# Patient Record
Sex: FEMALE | Race: BLACK OR AFRICAN AMERICAN | NOT HISPANIC OR LATINO | Employment: UNEMPLOYED | URBAN - METROPOLITAN AREA
[De-identification: names, ages, dates, MRNs, and addresses within clinical notes are randomized per-mention and may not be internally consistent; named-entity substitution may affect disease eponyms.]

---

## 2018-12-12 ENCOUNTER — LAB (OUTPATIENT)
Dept: LAB | Facility: HOSPITAL | Age: 23
End: 2018-12-12

## 2018-12-12 ENCOUNTER — OFFICE VISIT (OUTPATIENT)
Dept: OBSTETRICS AND GYNECOLOGY | Facility: CLINIC | Age: 23
End: 2018-12-12

## 2018-12-12 VITALS
WEIGHT: 293 LBS | DIASTOLIC BLOOD PRESSURE: 70 MMHG | SYSTOLIC BLOOD PRESSURE: 118 MMHG | BODY MASS INDEX: 47.09 KG/M2 | HEIGHT: 66 IN

## 2018-12-12 DIAGNOSIS — L68.0 ANDROGEN-DEPENDENT HIRSUTISM: ICD-10-CM

## 2018-12-12 DIAGNOSIS — E28.2 PCOS (POLYCYSTIC OVARIAN SYNDROME): Primary | ICD-10-CM

## 2018-12-12 DIAGNOSIS — Z01.411 ENCOUNTER FOR GYNECOLOGICAL EXAMINATION WITH ABNORMAL FINDING: ICD-10-CM

## 2018-12-12 DIAGNOSIS — E28.2 PCOS (POLYCYSTIC OVARIAN SYNDROME): ICD-10-CM

## 2018-12-12 LAB
GLUCOSE P FAST SERPL-MCNC: 94 MG/DL (ref 65–99)
TESTOST SERPL-MCNC: 37.59 NG/DL (ref 0–813.86)
TSH SERPL DL<=0.05 MIU/L-ACNC: 2.55 MIU/ML (ref 0.35–5.35)

## 2018-12-12 PROCEDURE — 84443 ASSAY THYROID STIM HORMONE: CPT

## 2018-12-12 PROCEDURE — 83525 ASSAY OF INSULIN: CPT

## 2018-12-12 PROCEDURE — 82947 ASSAY GLUCOSE BLOOD QUANT: CPT

## 2018-12-12 PROCEDURE — 99385 PREV VISIT NEW AGE 18-39: CPT | Performed by: OBSTETRICS & GYNECOLOGY

## 2018-12-12 PROCEDURE — 84403 ASSAY OF TOTAL TESTOSTERONE: CPT

## 2018-12-12 PROCEDURE — 36415 COLL VENOUS BLD VENIPUNCTURE: CPT

## 2018-12-12 NOTE — PROGRESS NOTES
Chief Complaint   Patient presents with   • Gynecologic Exam     patient was seen in the ED at [facility] in November.  States that she had a pap test at that time.  Was seen because she had been bleeding X 2 months.  was told that she has symptoms consistent with PCOS.       Tala Kelly is a 23 y.o. year old  presenting to be seen for her annual exam.  This patient has been seen at the [facility].  She has a long history of irregular menses, obesity, loss of hair on the scalp, facial hirsutism, and inability to conceive.  She has not had a complete workup.  She often goes 6-12 months without a period.  She has been bleeding for the past 6 weeks.  She desires evaluation.  Ultimately, she desires to conceive.    SCREENING TESTS  States that she had a Pap test at [facility]  Year 2012 2016 2017 2018  2024 2025 2026 2027 2028 2029 2030 2031 2032 2033   Age                         PAP                         HPV high risk                         Mammogram                         EMILY score                         Breast MRI                         Lipids                         Vitamin D                         Colonoscopy                         DEXA  Frax (hip/any)                         Ovarian Screen                             She exercises regularly: no.  She wears her seat belt: yes.  She has concerns about domestic violence: no.  She has noticed changes in height: no    GYN screening history:  · None available.    No Additional Complaints Reported    The following portions of the patient's history were reviewed and updated as appropriate:vital signs and   She  has no past medical history on file.  She does not have any pertinent problems on file.  She  has no past surgical history on file.  Her family history includes Breast cancer in her maternal aunt and maternal grandmother.  She  reports that she has quit smoking. she has never used  "smokeless tobacco. She reports that she drinks alcohol. She reports that she does not use drugs.  Current Outpatient Medications   Medication Sig Dispense Refill   • norgestrel-ethinyl estradiol (LOW-OGESTREL,CRYSELLE) 0.3-30 MG-MCG per tablet Take 1 tablet by mouth Daily. 28 tablet 11     No current facility-administered medications for this visit.      She is allergic to tramadol..    Review of Systems  A comprehensive review of systems was taken.  Constitutional: negative for fever, chills, activity change, appetite change, fatigue and unexpected weight change.  Respiratory: negative  Cardiovascular: negative  Gastrointestinal: negative  Genitourinary:positive for irregular menses  Musculoskeletal:negative  Behavioral/Psych: negative       /70   Ht 167.6 cm (66\")   Wt (!) 161 kg (355 lb 6.4 oz)   LMP 09/25/2018 (Approximate)   BMI 57.36 kg/m²     Physical Exam    General:  alert; cooperative; well developed; well nourished  obese - Body mass index is 57.36 kg/m².   Skin:  No suspicious lesions seen  Facial hirsutism   Thyroid: normal to inspection and palpation   Lungs:  clear to auscultation bilaterally   Heart:  regular rate and rhythm, S1, S2 normal, no murmur, click, rub or gallop   Breasts:  Examined in supine position  Symmetric without masses or skin dimpling  Nipples normal without inversion, lesions or discharge  There are no palpable axillary nodes   Abdomen: soft, non-tender; no masses  no umbilical or inguinal hernias are present  no hepato-splenomegaly  obese   Pelvis: Clinical staff was present for exam  External genitalia:  normal appearance of the external genitalia including Bartholin's and [place]'s glands.  Vaginal:  normal pink mucosa without prolapse or lesions.  Cervix:  normal appearance.  Uterus:  normal size, shape and consistency. anteverted;  Adnexa:  non palpable bilaterally.     Lab Review   No data reviewed    Imaging  No data reviewed         ASSESSMENT  Problems Addressed " this Visit        Digestive    BMI 50.0-59.9, adult (CMS/[facility])       Endocrine    PCOS (polycystic ovarian syndrome) - Primary    Relevant Orders    Glucose, Fasting    Insulin, Random    TSH    Testosterone    US Non-ob Transvaginal      Other Visit Diagnoses     Encounter for gynecological examination with abnormal finding        Androgen-dependent hirsutism              PLAN    · Medications prescribed this encounter:  No orders of the defined types were placed in this encounter.  · Sting glucose, fasting insulin, TSH, and total testosterone ordered  · Pelvic ultrasound ordered.  · The patient was started on Low Ogestrel oral contraceptives and will return in 1 month to review her labs and pelvic ultrasound  · Calcium, 600 mg/ Vit. D, 400 IU daily; regular weight-bearing exercise  · Follow up: 1 month(s)  *Please note that portions of this documentation may have been completed with a voice recognition program.  Efforts were made to edit this dictation, but occasional words may have been mistranscribed.       This note was electronically signed.    SUSAN Loredo MD  December 12, 2018  9:30 AM

## 2018-12-14 LAB — INSULIN SERPL-ACNC: 18 UIU/ML

## 2018-12-18 ENCOUNTER — TELEPHONE (OUTPATIENT)
Dept: OBSTETRICS AND GYNECOLOGY | Facility: CLINIC | Age: 23
End: 2018-12-18

## 2018-12-18 RX ORDER — SPIRONOLACTONE 50 MG/1
50 TABLET, FILM COATED ORAL DAILY
Qty: 30 TABLET | Refills: 3 | Status: SHIPPED | OUTPATIENT
Start: 2018-12-18 | End: 2019-01-10

## 2018-12-18 NOTE — TELEPHONE ENCOUNTER
Per Dr. Loredo, patient has been contacted by telephone and notified that she needs to start Metformin 500 mg BID and spironolactone 50 mg daily.

## 2018-12-21 ENCOUNTER — TELEPHONE (OUTPATIENT)
Dept: OBSTETRICS AND GYNECOLOGY | Facility: CLINIC | Age: 23
End: 2018-12-21

## 2018-12-21 NOTE — TELEPHONE ENCOUNTER
Patient called c/o nausea after beginning metformin.  I advised her to take with food and to give it some time.  She will call back if she can not tolerate this medication.

## 2019-01-10 ENCOUNTER — OFFICE VISIT (OUTPATIENT)
Dept: OBSTETRICS AND GYNECOLOGY | Facility: CLINIC | Age: 24
End: 2019-01-10

## 2019-01-10 VITALS
SYSTOLIC BLOOD PRESSURE: 120 MMHG | BODY MASS INDEX: 47.09 KG/M2 | DIASTOLIC BLOOD PRESSURE: 76 MMHG | HEIGHT: 66 IN | WEIGHT: 293 LBS

## 2019-01-10 DIAGNOSIS — N92.0 MENORRHAGIA WITH REGULAR CYCLE: ICD-10-CM

## 2019-01-10 DIAGNOSIS — E28.2 PCOS (POLYCYSTIC OVARIAN SYNDROME): Primary | ICD-10-CM

## 2019-01-10 PROCEDURE — 99213 OFFICE O/P EST LOW 20 MIN: CPT | Performed by: OBSTETRICS & GYNECOLOGY

## 2019-01-10 RX ORDER — IBUPROFEN 800 MG/1
800 TABLET ORAL EVERY 6 HOURS PRN
Qty: 40 TABLET | Refills: 1 | OUTPATIENT
Start: 2019-01-10 | End: 2019-08-11

## 2019-01-10 NOTE — PROGRESS NOTES
Chief Complaint   Patient presents with   • Results     pelvic ultrasound today, discuss labs and medications.   • Constipation       Tala Kelly is a 23 y.o. year old  presenting to be seen for follow-up of polycystic ovarian syndrome, and menorrhagia.  This patient was seen in 2018 and had a glucose/insulin of 94/18 with a ratio of 5.2/1.0.  Her total testosterone was 37.59.  Her TSH was normal.  She was started on low Ogestrel oral contraceptives to attempt to control her cycles and heavy bleeding.  She was started on metformin extended release, 500 mg twice a day to treat insulin resistance.  She was started on Aldactone, 50 mg twice a day but was unable to tolerate this due to nausea.  She is here today for a pelvic ultrasound.    Social History     Substance and Sexual Activity   Sexual Activity Yes   • Partners: Male   • Birth control/protection: OCP     SCREENING TESTS    Year 2012   Age                         PAP       Neg.                  HPV high risk                         Mammogram                         EMILY score                         Breast MRI                         Lipids                         Vitamin D                         Colonoscopy                         DEXA  Frax (hip/any)                         Ovarian Screen                             No Additional Complaints Reported    The following portions of the patient's history were reviewed and updated as appropriate:vital signs and   She  has a past medical history of PCOS (polycystic ovarian syndrome).  She does not have any pertinent problems on file.  She  has no past surgical history on file.  Current Outpatient Medications   Medication Sig Dispense Refill   • metFORMIN (GLUCOPHAGE) 500 MG tablet Take 1 tablet by mouth 2 (Two) Times a Day With Meals. 60 tablet 3   • norgestrel-ethinyl estradiol  "(LOW-OGESTREL,CRYSELLE) 0.3-30 MG-MCG per tablet Take 1 tablet by mouth Daily. 28 tablet 11     No current facility-administered medications for this visit.      She is allergic to tramadol..        Review of Systems  A comprehensive review of systems was not done.  Constitutional: negative for fever, chills, activity change, appetite change, fatigue and unexpected weight change.  Respiratory: not done  Cardiovascular: negative  Gastrointestinal: positive for constipation  Genitourinary:negative  Musculoskeletal:not done  Behavioral/Psych: negative          /76   Ht 167.6 cm (66\")   Wt (!) 158 kg (348 lb 3.2 oz)   LMP 01/03/2019   BMI 56.20 kg/m²     Physical Exam    General:  alert; cooperative; well developed; well nourished  obese - Body mass index is 56.2 kg/m².   Skin:  Not performed.   Thyroid: normal to inspection and palpation   Lungs:  clear to auscultation bilaterally   Heart:  regular rate and rhythm, S1, S2 normal, no murmur, click, rub or gallop   Breasts:  Not performed.   Abdomen: Not performed.   Pelvis: Not performed.     Lab Review   TSH results and Glucose/insulin results. Glucose= 94 and insulin= 18.    Imaging   Pelvic ultrasound results- the ultrasound today reveals a normal size anteverted uterus with an endometrial stripe of 11.7 mm.  The cervix is normal.  The ovaries are normal.  There is a small echogenic area in the right ovary.    Medical counseling was given to patient for the following topics: diagnostic results, instructions for management, risk factor reductions, prognosis, impressions, risks and benefits of treatment options and importance of treatment compliance . Total time of the encounter was 19 minute(s) and 15 minute(s)  was spent in face to face counseling.  I have counseled the patient that the purpose of treating her with Low Ogestrel is to attempt to control her cycles and decrease her flow.  I have counseled her to take ibuprofen, 800 mg every 6 hours for the " first 2 days of each period I have counseled her to continue metformin extended release, 500 mg twice a day.  I have counseled her to use MiraLAX nightly for constipation and to increase fiber and fluids.  I have counseled her that we will repeat her laboratory evaluation in April 2019 to assess response.  I have reviewed with her potential side effects of her medications          ASSESSMENT  Problems Addressed this Visit        Digestive    BMI 50.0-59.9, adult (CMS/[facility])       Endocrine    PCOS (polycystic ovarian syndrome) - Primary       Genitourinary    Menorrhagia with regular cycle            PLAN    · Medications prescribed this encounter:  No orders of the defined types were placed in this encounter.  · Upon return she will have a fasting glucose, fasting insulin, and a repeat total testosterone  · Follow up: 3 month(s)  · Calcium, 600 mg/ Vit. D, 400 IU daily     *Please note that portions of this documentation may have been completed with a voice recognition program.  Efforts were made to edit this dictation, but occasional words may have been mistranscribed.     This note was electronically signed.    SUSAN Loredo MD  January 10, 2019  10:33 AM

## 2019-08-11 ENCOUNTER — APPOINTMENT (OUTPATIENT)
Dept: GENERAL RADIOLOGY | Facility: HOSPITAL | Age: 24
End: 2019-08-11

## 2019-08-11 ENCOUNTER — HOSPITAL ENCOUNTER (EMERGENCY)
Facility: HOSPITAL | Age: 24
Discharge: HOME | End: 2019-08-11
Attending: EMERGENCY MEDICINE | Admitting: EMERGENCY MEDICINE

## 2019-08-11 VITALS
SYSTOLIC BLOOD PRESSURE: 110 MMHG | TEMPERATURE: 98.2 F | DIASTOLIC BLOOD PRESSURE: 62 MMHG | WEIGHT: 293 LBS | HEIGHT: 66 IN | BODY MASS INDEX: 47.09 KG/M2 | RESPIRATION RATE: 16 BRPM | OXYGEN SATURATION: 97 % | HEART RATE: 88 BPM

## 2019-08-11 DIAGNOSIS — S83.91XA SPRAIN OF RIGHT KNEE, UNSPECIFIED LIGAMENT, INITIAL ENCOUNTER: Primary | ICD-10-CM

## 2019-08-11 DIAGNOSIS — M25.461 EFFUSION, RIGHT KNEE: ICD-10-CM

## 2019-08-11 PROCEDURE — 73560 X-RAY EXAM OF KNEE 1 OR 2: CPT

## 2019-08-11 PROCEDURE — 99283 EMERGENCY DEPT VISIT LOW MDM: CPT

## 2019-08-11 RX ORDER — ETODOLAC 200 MG/1
200 CAPSULE ORAL EVERY 8 HOURS
Qty: 12 CAPSULE | Refills: 0 | Status: SHIPPED | OUTPATIENT
Start: 2019-08-11 | End: 2020-01-28

## 2019-08-11 RX ORDER — HYDROCODONE BITARTRATE AND ACETAMINOPHEN 7.5; 325 MG/1; MG/1
1 TABLET ORAL ONCE
Status: COMPLETED | OUTPATIENT
Start: 2019-08-11 | End: 2019-08-11

## 2019-08-11 RX ADMIN — HYDROCODONE BITARTRATE AND ACETAMINOPHEN 1 TABLET: 7.5; 325 TABLET ORAL at 13:13

## 2019-08-11 NOTE — ED PROVIDER NOTES
Subjective   Tala Kelly is a 23 y.o.female who presents to the ED with complaints of right knee pain. The patient reports her pain has been ongoing since she had to climb out the window of a car yesterday. She has not taken any medication for her pain. Additionally, she has been ambulatory with pain since the incident.There are no other complaints at this time.         History provided by:  Patient  Knee Pain   Location:  Knee  Time since incident:  1 day  Injury: yes    Mechanism of injury comment:  Climbing out the window of a car  Knee location:  R knee  Pain details:     Quality:  Aching    Radiates to:  Does not radiate    Severity:  Moderate    Onset quality:  Sudden    Duration:  2 days    Timing:  Constant    Progression:  Unchanged  Chronicity:  New  Dislocation: no    Foreign body present:  No foreign bodies  Prior injury to area:  No  Relieved by:  None tried  Worsened by:  Nothing  Ineffective treatments:  None tried      Review of Systems   Musculoskeletal: Positive for arthralgias (right knee).   All other systems reviewed and are negative.      Past Medical History:   Diagnosis Date   • PCOS (polycystic ovarian syndrome)        Allergies   Allergen Reactions   • Tramadol Shortness Of Breath     hives       History reviewed. No pertinent surgical history.    Family History   Problem Relation Age of Onset   • Breast cancer Maternal Grandmother    • Breast cancer Maternal Aunt        Social History     Socioeconomic History   • Marital status: Single     Spouse name: Not on file   • Number of children: Not on file   • Years of education: Not on file   • Highest education level: Not on file   Tobacco Use   • Smoking status: Former Smoker   • Smokeless tobacco: Never Used   Substance and Sexual Activity   • Alcohol use: Yes     Comment: socially   • Drug use: No   • Sexual activity: Yes     Partners: Male     Birth control/protection: OCP         Objective   Physical Exam   Constitutional: She is  "oriented to person, place, and time. She appears well-developed and well-nourished. No distress.   HENT:   Head: Normocephalic and atraumatic.   Nose: Nose normal.   Eyes: Conjunctivae are normal. No scleral icterus.   Neck: Normal range of motion. Neck supple.   Cardiovascular: Intact distal pulses.   Pulmonary/Chest: Effort normal. No respiratory distress.   Musculoskeletal: Normal range of motion. She exhibits tenderness. She exhibits no edema.   Right knee tenderness. Painful range of motion, but intact.    Neurological: She is alert and oriented to person, place, and time.   Skin: Skin is warm and dry.   Psychiatric: She has a normal mood and affect. Her behavior is normal.   Nursing note and vitals reviewed.      Procedures         ED Course     No results found for this or any previous visit (from the past 24 hour(s)).  Note: In addition to lab results from this visit, the labs listed above may include labs taken at another facility or during a different encounter within the last 24 hours. Please correlate lab times with ED admission and discharge times for further clarification of the services performed during this visit.    XR Knee 1 or 2 View Right   Preliminary Result   No acute osseous abnormality with potential small   suprapatellar joint effusion.                Vitals:    08/11/19 1204 08/11/19 1321   BP: 109/67    BP Location: Left arm    Patient Position: Lying    Pulse: 90    Resp: 16    Temp: 98 °F (36.7 °C)    TempSrc: Oral    SpO2: 96%    Weight: (!) 157 kg (347 lb) (!) 164 kg (361 lb)   Height: 167.6 cm (66\")      Medications   HYDROcodone-acetaminophen (NORCO) 7.5-325 MG per tablet 1 tablet (1 tablet Oral Given 8/11/19 1313)     ECG/EMG Results (last 24 hours)     ** No results found for the last 24 hours. **        No orders to display           XR Knee 1 or 2 View Right   Preliminary Result   No acute osseous abnormality with potential small   suprapatellar joint effusion.                    "         MDM    Final diagnoses:   Sprain of right knee, unspecified ligament, initial encounter   Effusion, right knee       Documentation assistance provided by tenzin Thomason.  Information recorded by the scriblian was done at my direction and has been verified and validated by me.     Sumit Thomason  08/11/19 9014       Dilip Sol APRN  08/11/19 3000

## 2019-11-21 ENCOUNTER — TELEPHONE (OUTPATIENT)
Dept: OBSTETRICS AND GYNECOLOGY | Facility: CLINIC | Age: 24
End: 2019-11-21

## 2020-01-28 ENCOUNTER — OFFICE VISIT (OUTPATIENT)
Dept: OBSTETRICS AND GYNECOLOGY | Facility: CLINIC | Age: 25
End: 2020-01-28

## 2020-01-28 VITALS
DIASTOLIC BLOOD PRESSURE: 80 MMHG | SYSTOLIC BLOOD PRESSURE: 118 MMHG | BODY MASS INDEX: 47.09 KG/M2 | HEIGHT: 66 IN | WEIGHT: 293 LBS

## 2020-01-28 DIAGNOSIS — Z30.41 SURVEILLANCE OF PREVIOUSLY PRESCRIBED CONTRACEPTIVE PILL: ICD-10-CM

## 2020-01-28 DIAGNOSIS — Z01.419 ENCOUNTER FOR GYNECOLOGICAL EXAMINATION WITHOUT ABNORMAL FINDING: ICD-10-CM

## 2020-01-28 DIAGNOSIS — E28.2 PCOS (POLYCYSTIC OVARIAN SYNDROME): Primary | ICD-10-CM

## 2020-01-28 DIAGNOSIS — N92.6 IRREGULAR MENSES: ICD-10-CM

## 2020-01-28 PROCEDURE — 99395 PREV VISIT EST AGE 18-39: CPT | Performed by: OBSTETRICS & GYNECOLOGY

## 2020-01-28 RX ORDER — SPIRONOLACTONE 100 MG/1
100 TABLET, FILM COATED ORAL DAILY
Qty: 30 TABLET | Refills: 11 | Status: SHIPPED | OUTPATIENT
Start: 2020-01-28 | End: 2021-01-27

## 2020-01-28 NOTE — PROGRESS NOTES
Chief Complaint   Patient presents with   • Gynecologic Exam   • Polycystic Ovary Syndrome   • Med Refill       Tala Kelly is a 24 y.o. year old  presenting to be seen for her annual exam.  This patient has been seen in the past for syndrome of obesity, difficulty with weight control, facial hirsutism, and irregular menses.  She had a pelvic ultrasound on 1/10/2019 which was normal and did not reveal polycystic ovaries.  Her glucose insulin ratio was 5.2/1.0 and she was started on metformin, 500 mg twice daily.  She had a history of irregular menses and was treated with Low-Ogestrel oral contraceptives for cycle regulation.  She has discontinued both of these medications.  She was also treated with Spironolactone, 50 mg twice a day to treat acne and facial hair growth.  She has discontinued this medication.  She desires to restart her medications and is willing to follow a low carbohydrate diet.  She is willing to have repeat labs done in 3 months.    SCREENING TESTS  She has never had a Pap test done  Year 2012   Age                         PAP                         HPV high risk                         Mammogram                         EMILY score                         Breast MRI                         Lipids                         Vitamin D                         Colonoscopy                         DEXA  Frax (hip/any)                         Ovarian Screen                             She exercises regularly: no.  She wears her seat belt: yes.  She has concerns about domestic violence: no.  She has noticed changes in height: no    GYN screening history:  · No data.    No Additional Complaints Reported    The following portions of the patient's history were reviewed and updated as appropriate:vital signs and   She  has a past medical history of PCOS (polycystic ovarian syndrome) and Torn  "ACL.  She does not have any pertinent problems on file.  She  has no past surgical history on file.  Her family history includes Breast cancer in her maternal aunt and maternal grandmother; Schizophrenia in her maternal uncle and mother.  She  reports that she has been smoking cigarettes. She has been smoking about 0.50 packs per day. She has never used smokeless tobacco. She reports that she drinks alcohol. She reports that she does not use drugs.  Current Outpatient Medications   Medication Sig Dispense Refill   • metFORMIN (GLUCOPHAGE) 500 MG tablet Take 1 tablet by mouth 2 (Two) Times a Day With Meals. 60 tablet 11   • norgestrel-ethinyl estradiol (LOW-OGESTREL,CRYSELLE) 0.3-30 MG-MCG per tablet Take 1 tablet by mouth Daily. 28 tablet 11   • spironolactone (ALDACTONE) 100 MG tablet Take 1 tablet by mouth Daily. 30 tablet 11     No current facility-administered medications for this visit.      She is allergic to tramadol..    Review of Systems  A comprehensive review of systems was taken.  Constitutional: negative for fever, chills, activity change, appetite change, fatigue and unexpected weight change.  Respiratory: negative  Cardiovascular: negative  Gastrointestinal: negative  Genitourinary:negative  Musculoskeletal:positive for stiff joints  Behavioral/Psych: negative       /80   Ht 167.6 cm (66\")   Wt (!) 169 kg (372 lb)   LMP 01/16/2020 (Approximate)   Breastfeeding No   BMI 60.04 kg/m²     Physical Exam    General:  alert; cooperative; well developed; well nourished  obese - Body mass index is 60.04 kg/m².   Skin:  No suspicious lesions seen   Thyroid: normal to inspection and palpation   Lungs:  clear to auscultation bilaterally   Heart:  regular rate and rhythm, S1, S2 normal, no murmur, click, rub or gallop   Breasts:  Examined in supine position  Symmetric without masses or skin dimpling  Nipples normal without inversion, lesions or discharge  There are no palpable axillary nodes   Abdomen: " soft, non-tender; no masses  no umbilical or inguinal hernias are present  no hepato-splenomegaly  obese   Pelvis: Clinical staff was present for exam  External genitalia:  normal appearance of the external genitalia including Bartholin's and [place]'s glands.  Vaginal:  normal pink mucosa without prolapse or lesions.  Cervix:  normal appearance.  Uterus:  normal size, shape and consistency. anteverted;  Adnexa:  non palpable bilaterally.  Rectal:  anus visually normal appearing. recto-vaginal exam unremarkable and confirms findings;     Lab Review   Prior glucose-94 and insulin-18 results    Imaging  Pelvic ultrasound results- normal         ASSESSMENT  Problems Addressed this Visit        Digestive    BMI 50.0-59.9, adult (CMS/[facility])       Endocrine    PCOS (polycystic ovarian syndrome) - Primary    Relevant Medications    spironolactone (ALDACTONE) 100 MG tablet    metFORMIN (GLUCOPHAGE) 500 MG tablet       Other    Surveillance of previously prescribed contraceptive pill    Relevant Medications    norgestrel-ethinyl estradiol (LOW-OGESTREL,CRYSELLE) 0.3-30 MG-MCG per tablet      Other Visit Diagnoses     Encounter for gynecological examination without abnormal finding        Relevant Orders    Liquid-based Pap Smear, Screening    Irregular menses                  Substance History:   reports that she has been smoking cigarettes. She has been smoking about 0.50 packs per day. She has never used smokeless tobacco.   reports that she drinks alcohol.   reports that she does not use drugs.    Substance use counseling was provided during this visit related to history of positive alcohol and tobacco use.  I have strongly emphasized that the patient should limit her alcohol intake.  I have strongly advised the patient to stop smoking cigarettes, however at the present time she is Unwilling to stop.      PLAN    Medications prescribed this encounter:    New Medications Ordered This Visit   Medications   • norgestrel-ethinyl  estradiol (LOW-OGESTREL,CRYSELLE) 0.3-30 MG-MCG per tablet     Sig: Take 1 tablet by mouth Daily.     Dispense:  28 tablet     Refill:  11   • spironolactone (ALDACTONE) 100 MG tablet     Sig: Take 1 tablet by mouth Daily.     Dispense:  30 tablet     Refill:  11   • metFORMIN (GLUCOPHAGE) 500 MG tablet     Sig: Take 1 tablet by mouth 2 (Two) Times a Day With Meals.     Dispense:  60 tablet     Refill:  11   · I have re--emphasized the low carbohydrate diet and the need for regular exercise  · She will have a repeat fasting glucose and insulin done on 4/29/2020  · Regular weight-bearing exercise  · Follow up: 12 month(s) unless labs are abnormal  *Please note that portions of this documentation may have been completed with a voice recognition program.  Efforts were made to edit this dictation, but occasional words may have been mistranscribed.       This note was electronically signed.    SUSAN Loredo MD  January 28, 2020  4:45 PM

## 2020-09-15 ENCOUNTER — TELEPHONE (OUTPATIENT)
Dept: OBSTETRICS AND GYNECOLOGY | Facility: CLINIC | Age: 25
End: 2020-09-15

## 2020-09-16 ENCOUNTER — TELEPHONE (OUTPATIENT)
Dept: OBSTETRICS AND GYNECOLOGY | Facility: CLINIC | Age: 25
End: 2020-09-16

## 2020-11-11 ENCOUNTER — OFFICE VISIT (OUTPATIENT)
Dept: OBSTETRICS AND GYNECOLOGY | Facility: CLINIC | Age: 25
End: 2020-11-11

## 2020-11-11 VITALS
WEIGHT: 293 LBS | BODY MASS INDEX: 47.09 KG/M2 | SYSTOLIC BLOOD PRESSURE: 120 MMHG | HEIGHT: 66 IN | DIASTOLIC BLOOD PRESSURE: 78 MMHG

## 2020-11-11 DIAGNOSIS — E28.2 PCOS (POLYCYSTIC OVARIAN SYNDROME): ICD-10-CM

## 2020-11-11 DIAGNOSIS — N76.0 BV (BACTERIAL VAGINOSIS): Primary | ICD-10-CM

## 2020-11-11 DIAGNOSIS — B96.89 BV (BACTERIAL VAGINOSIS): Primary | ICD-10-CM

## 2020-11-11 PROCEDURE — 99214 OFFICE O/P EST MOD 30 MIN: CPT | Performed by: OBSTETRICS & GYNECOLOGY

## 2020-11-11 PROCEDURE — 87210 SMEAR WET MOUNT SALINE/INK: CPT | Performed by: OBSTETRICS & GYNECOLOGY

## 2020-11-11 PROCEDURE — 83986 ASSAY PH BODY FLUID NOS: CPT | Performed by: OBSTETRICS & GYNECOLOGY

## 2020-11-11 RX ORDER — METRONIDAZOLE 7.5 MG/G
GEL VAGINAL NIGHTLY
Qty: 70 G | Refills: 0 | Status: SHIPPED | OUTPATIENT
Start: 2020-11-11 | End: 2020-11-16

## 2020-11-11 NOTE — PROGRESS NOTES
Chief Complaint   Patient presents with   • Vaginitis     c/o irritation and itching, intermittent malodor       Tala Kelly is a 25 y.o. year old  presenting to be seen because of follow-up for PCOS with insulin resistance treated with Metformin, 500 mg twice a day.  Irregular menses have been treated with Low-Ogestrel oral contraceptives.  She presents today with complaints of vaginal malodor and increased discharge.  She has not been on antibiotics or steroids.  She denies bowel or urinary symptoms.    Social History     Substance and Sexual Activity   Sexual Activity Yes   • Partners: Female   • Birth control/protection: OCP     SCREENING TESTS    Year 2012   Age                         PAP         Neg.                HPV high risk                         Mammogram                         EMILY score                         Breast MRI                         Lipids                         Vitamin D                         Colonoscopy                         DEXA  Frax (hip/any)                         Ovarian Screen                             No Additional Complaints Reported    The following portions of the patient's history were reviewed and updated as appropriate:vital signs and   She  has a past medical history of PCOS (polycystic ovarian syndrome) and Torn ACL.  She does not have any pertinent problems on file.  She  has no past surgical history on file.  Her family history includes Breast cancer in her maternal aunt and maternal grandmother; Schizophrenia in her maternal uncle and mother.  She  reports that she has been smoking cigarettes. She has been smoking about 0.50 packs per day. She has never used smokeless tobacco. She reports current alcohol use. She reports that she does not use drugs.  Current Outpatient Medications   Medication Sig Dispense Refill   • metFORMIN (GLUCOPHAGE) 500  "MG tablet Take 1 tablet by mouth 2 (Two) Times a Day With Meals. 60 tablet 11   • metroNIDAZOLE (METROGEL VAGINAL) 0.75 % vaginal gel Insert  into the vagina Every Night for 5 days. 70 g 0   • norgestrel-ethinyl estradiol (LOW-OGESTREL,CRYSELLE) 0.3-30 MG-MCG per tablet Take 1 tablet by mouth Daily. 28 tablet 11   • spironolactone (ALDACTONE) 100 MG tablet Take 1 tablet by mouth Daily. 30 tablet 11     No current facility-administered medications for this visit.      She is allergic to tramadol..        Review of Systems  A review of systems was done.  She denies cough, fever, shortness of breath, and loss of her sense of taste or smell  Constitutional: negative for fever, chills, activity change, appetite change, fatigue and unexpected weight change.  Respiratory: negative  Cardiovascular: negative  Gastrointestinal: negative  Genitourinary:positive for odor  Musculoskeletal:negative  Behavioral/Psych: negative          /78   Ht 167.6 cm (66\")   Wt (!) 169 kg (371 lb 9.6 oz)   LMP 09/17/2020 (Approximate)   Breastfeeding No   BMI 59.98 kg/m²     Physical Exam    General:  alert; cooperative; well developed; well nourished  obese - Body mass index is 59.98 kg/m².   Skin:  No suspicious lesions seen   Thyroid: normal to inspection and palpation   Lungs:  clear to auscultation bilaterally   Heart:  regular rate and rhythm, S1, S2 normal, no murmur, click, rub or gallop   Breasts:  Not performed.   Abdomen: soft, non-tender; no masses  no umbilical or inguinal hernias are present  no hepato-splenomegaly  Obese   Pelvis: Clinical staff was present for exam  External genitalia:  normal appearance of the external genitalia including Bartholin's and Waucoma's glands.  Vaginal:  discharge present -  green; pH = 5.0 wet prep done: clue cells are present;  Cervix:  normal appearance.  Uterus:  normal size, shape and consistency. anteverted;  Adnexa:  non palpable bilaterally.     Lab Review   No data " reviewed    Imaging   No data reviewed    Medical counseling was given to patient for the following topics: diagnostic results, instructions for management, risk factor reductions, impressions, risks and benefits of treatment options and importance of treatment compliance . Total time of the encounter was 25 minute(s) and 15 minute(s)  was spent in face to face counseling.  I have reviewed with the patient her diagnosis of PCOS with insulin resistance.  I have counseled the patient that she needs to continue taking Metformin, 500 mg twice a day with meals.  I have emphasized that she must have a fasting glucose and random insulin drawn since she has not had labs for over 1 year.  She reassures me that she will have this done in 2 days.  I have counseled her to continue taking Low-Ogestrel for cycle regulation.  She has no side effects on this pill.  She does forget to initiate her pack on time.  I have emphasized to her the necessity of taking her pills daily.  I have counseled her that she has bacterial vaginosis and will need to be treated with MetroGel vaginal nightly for 5 nights.            ASSESSMENT  Problems Addressed this Visit        Endocrine    PCOS (polycystic ovarian syndrome)    Relevant Orders    Glucose, Fasting    Insulin, Random      Other Visit Diagnoses     BV (bacterial vaginosis)    -  Primary    Relevant Medications    metroNIDAZOLE (METROGEL VAGINAL) 0.75 % vaginal gel      Diagnoses       Codes Comments    BV (bacterial vaginosis)    -  Primary ICD-10-CM: N76.0, B96.89  ICD-9-CM: 616.10, 041.9     PCOS (polycystic ovarian syndrome)     ICD-10-CM: E28.2  ICD-9-CM: 256.4            Substance History:    reports that she has been smoking cigarettes. She has been smoking about 0.50 packs per day. She has never used smokeless tobacco.    reports current alcohol use.    reports no history of drug use.    Substance use counseling was provided during this visit related to history of positive  alcohol and tobacco use.  I have strongly advised the patient to stop smoking cigarettes since she is taking oral contraceptives.  I have advised the patient to limit her alcohol intake.      PLAN    Medications prescribed this encounter:    New Medications Ordered This Visit   Medications   • metroNIDAZOLE (METROGEL VAGINAL) 0.75 % vaginal gel     Sig: Insert  into the vagina Every Night for 5 days.     Dispense:  70 g     Refill:  0   · She will continue taking Low-Ogestrel daily  · Fasting glucose and random insulin were ordered  · Metformin, 500 mg by mouth twice a day with meals  · I have instructed the patient in monthly self breast assessment  · Follow up: 6 month(s)  · I have encouraged the patient to follow a low carbohydrate diet and to increase exercise     *Please note that portions of this documentation may have been completed with a voice recognition program.  Efforts were made to edit this dictation, but occasional words may have been mistranscribed.     This note was electronically signed.    SUSAN Loredo MD  November 11, 2020  14:54 EST

## 2021-02-25 ENCOUNTER — TELEPHONE (OUTPATIENT)
Dept: OBSTETRICS AND GYNECOLOGY | Facility: CLINIC | Age: 26
End: 2021-02-25

## 2021-06-14 ENCOUNTER — TELEPHONE (OUTPATIENT)
Dept: OBSTETRICS AND GYNECOLOGY | Facility: CLINIC | Age: 26
End: 2021-06-14

## 2021-06-14 NOTE — TELEPHONE ENCOUNTER
Patient called back.  She was informed that she is overdue for labs.  Also did not show for annual exam in February 2021.  She has been scheduled to come in tomorrow for annual exam and overdue labs will be discussed at that time.

## 2021-06-14 NOTE — TELEPHONE ENCOUNTER
I have attempted to contact patient regarding overdue labs.  No answer, but I have left a voicemail message asking Tala to return my call.  Labs should have been done in November 2020.

## 2021-09-29 VITALS
SYSTOLIC BLOOD PRESSURE: 145 MMHG | RESPIRATION RATE: 16 BRPM | OXYGEN SATURATION: 97 % | HEIGHT: 66 IN | WEIGHT: 293 LBS | HEART RATE: 89 BPM | TEMPERATURE: 98.5 F | BODY MASS INDEX: 47.09 KG/M2 | DIASTOLIC BLOOD PRESSURE: 92 MMHG

## 2021-09-29 PROCEDURE — 99283 EMERGENCY DEPT VISIT LOW MDM: CPT

## 2021-09-30 ENCOUNTER — HOSPITAL ENCOUNTER (EMERGENCY)
Facility: HOSPITAL | Age: 26
Discharge: HOME | End: 2021-09-30
Attending: EMERGENCY MEDICINE | Admitting: EMERGENCY MEDICINE

## 2021-09-30 DIAGNOSIS — R59.0 ANTERIOR CERVICAL LYMPHADENOPATHY: ICD-10-CM

## 2021-09-30 DIAGNOSIS — Z20.818 STREPTOCOCCUS EXPOSURE: ICD-10-CM

## 2021-09-30 DIAGNOSIS — J02.9 ACUTE PHARYNGITIS, UNSPECIFIED ETIOLOGY: Primary | ICD-10-CM

## 2021-09-30 LAB
HETEROPH AB SER QL LA: NEGATIVE
S PYO AG THROAT QL: NEGATIVE

## 2021-09-30 PROCEDURE — 86308 HETEROPHILE ANTIBODY SCREEN: CPT | Performed by: EMERGENCY MEDICINE

## 2021-09-30 PROCEDURE — 87880 STREP A ASSAY W/OPTIC: CPT | Performed by: EMERGENCY MEDICINE

## 2021-09-30 PROCEDURE — 87081 CULTURE SCREEN ONLY: CPT | Performed by: EMERGENCY MEDICINE

## 2021-09-30 RX ORDER — ACETAMINOPHEN 325 MG/1
650 TABLET ORAL ONCE
Status: COMPLETED | OUTPATIENT
Start: 2021-09-30 | End: 2021-09-30

## 2021-09-30 RX ORDER — IBUPROFEN 600 MG/1
600 TABLET ORAL ONCE
Status: COMPLETED | OUTPATIENT
Start: 2021-09-30 | End: 2021-09-30

## 2021-09-30 RX ORDER — CEPHALEXIN 250 MG/1
500 CAPSULE ORAL ONCE
Status: COMPLETED | OUTPATIENT
Start: 2021-09-30 | End: 2021-09-30

## 2021-09-30 RX ORDER — CEPHALEXIN 500 MG/1
500 CAPSULE ORAL 2 TIMES DAILY
Qty: 20 CAPSULE | Refills: 0 | Status: SHIPPED | OUTPATIENT
Start: 2021-09-30

## 2021-09-30 RX ADMIN — CEPHALEXIN 500 MG: 250 CAPSULE ORAL at 02:51

## 2021-09-30 RX ADMIN — IBUPROFEN 600 MG: 600 TABLET ORAL at 00:56

## 2021-09-30 RX ADMIN — ACETAMINOPHEN 650 MG: 325 TABLET, FILM COATED ORAL at 01:51

## 2021-10-02 LAB — BACTERIA SPEC AEROBE CULT: NORMAL

## 2021-12-01 ENCOUNTER — TELEPHONE (OUTPATIENT)
Dept: OBSTETRICS AND GYNECOLOGY | Facility: CLINIC | Age: 26
End: 2021-12-01

## 2021-12-01 NOTE — TELEPHONE ENCOUNTER
Pt needs refill I made her an apt advised if she didn't show they would not refill to ful amount - expressed no show policy - can we give enough until can be seen - if not please advised pt

## 2021-12-01 NOTE — TELEPHONE ENCOUNTER
Patient has been advised that we can not refill/prescribe any medications until she is seen in the office.  She has been noncompliant regarding labs and appointments.  She voiced understanding and has an upcoming appointment with PILAR Dsouza.

## 2021-12-03 RX ORDER — NORGESTREL AND ETHINYL ESTRADIOL 0.3-0.03MG
1 KIT ORAL DAILY
Qty: 28 TABLET | Refills: 0 | Status: SHIPPED | OUTPATIENT
Start: 2021-12-03

## 2022-05-14 ENCOUNTER — HOSPITAL ENCOUNTER (EMERGENCY)
Facility: HOSPITAL | Age: 27
Discharge: HOME | End: 2022-05-14
Attending: EMERGENCY MEDICINE | Admitting: EMERGENCY MEDICINE

## 2022-05-14 VITALS
HEIGHT: 66 IN | WEIGHT: 293 LBS | RESPIRATION RATE: 18 BRPM | DIASTOLIC BLOOD PRESSURE: 86 MMHG | BODY MASS INDEX: 47.09 KG/M2 | OXYGEN SATURATION: 97 % | HEART RATE: 88 BPM | TEMPERATURE: 98.1 F | SYSTOLIC BLOOD PRESSURE: 132 MMHG

## 2022-05-14 DIAGNOSIS — N89.8 VAGINAL ITCHING: Primary | ICD-10-CM

## 2022-05-14 DIAGNOSIS — B37.31 VAGINAL CANDIDIASIS: ICD-10-CM

## 2022-05-14 PROCEDURE — 99282 EMERGENCY DEPT VISIT SF MDM: CPT

## 2022-05-14 RX ORDER — FLUCONAZOLE 150 MG/1
150 TABLET ORAL DAILY
Qty: 2 TABLET | Refills: 0 | Status: SHIPPED | OUTPATIENT
Start: 2022-05-14

## 2022-05-14 NOTE — ED PROVIDER NOTES
"Subjective   26-year-old female presents emergency department today with \"a yeast infection\".  She reports that she is not sexually active states she had quite a bit of vaginal itching and irritation.  She gets a yeast infection on a fairly regular basis and feels like she has another 1.  She reports that she has had little bit of discharge but its been white which she would normally get with her yeast infections.  She is not a diabetic.  She had no prior history of STDs.      History provided by:  Patient   used: No    Vaginal Itching  Location:  Vagina  Quality:  Itching  Severity:  Severe  Onset quality:  Sudden  Duration:  2 days  Timing:  Constant  Progression:  Waxing and waning  Chronicity:  Recurrent  Context:  Vaginal itching, no odor.  Relieved by:  Nothing  Worsened by:  Scratching  Ineffective treatments:  None tried  Associated symptoms: no abdominal pain, no chest pain, no fever, no nausea, no rash, no rhinorrhea, no sore throat and no vomiting        Review of Systems   Constitutional: Negative for chills and fever.   HENT: Negative for rhinorrhea and sore throat.    Respiratory: Negative for chest tightness.    Cardiovascular: Negative for chest pain.   Gastrointestinal: Negative for abdominal pain, nausea and vomiting.   Genitourinary: Negative for dysuria, frequency and urgency.   Musculoskeletal: Negative for back pain and neck pain.   Skin: Negative for rash.   Psychiatric/Behavioral: Negative.    All other systems reviewed and are negative.      Past Medical History:   Diagnosis Date   • PCOS (polycystic ovarian syndrome)    • Torn ACL     right       Allergies   Allergen Reactions   • Tramadol Shortness Of Breath     hives       History reviewed. No pertinent surgical history.    Family History   Problem Relation Age of Onset   • Breast cancer Maternal Grandmother    • Breast cancer Maternal Aunt    • Schizophrenia Mother    • Schizophrenia Maternal Uncle        Social " History     Socioeconomic History   • Marital status:    Tobacco Use   • Smoking status: Current Every Day Smoker     Packs/day: 0.50     Types: Cigarettes   • Smokeless tobacco: Never Used   Substance and Sexual Activity   • Alcohol use: Yes     Comment: socially   • Drug use: No   • Sexual activity: Yes     Partners: Female     Birth control/protection: OCP           Objective   Physical Exam  Vitals and nursing note reviewed.   Constitutional:       Appearance: She is well-developed.   HENT:      Head: Normocephalic and atraumatic.      Nose: Nose normal.   Eyes:      General: No scleral icterus.        Right eye: No discharge.         Left eye: No discharge.      Conjunctiva/sclera: Conjunctivae normal.   Neck:      Thyroid: No thyromegaly.      Vascular: No JVD.      Trachea: No tracheal deviation.   Cardiovascular:      Rate and Rhythm: Normal rate and regular rhythm.      Heart sounds: Normal heart sounds. No murmur heard.    No friction rub. No gallop.   Pulmonary:      Effort: Pulmonary effort is normal. No respiratory distress.      Breath sounds: Normal breath sounds. No stridor. No wheezing or rales.   Chest:      Chest wall: No tenderness.   Abdominal:      General: Bowel sounds are normal. There is no distension.      Palpations: Abdomen is soft. There is no mass.      Tenderness: There is no abdominal tenderness. There is no guarding or rebound.      Hernia: No hernia is present.   Musculoskeletal:         General: No tenderness or deformity. Normal range of motion.      Cervical back: Normal range of motion and neck supple.   Lymphadenopathy:      Cervical: No cervical adenopathy.   Skin:     General: Skin is warm and dry.      Coloration: Skin is not pale.      Findings: No erythema or rash.   Neurological:      Mental Status: She is alert and oriented to person, place, and time.      Cranial Nerves: No cranial nerve deficit.      Motor: No abnormal muscle tone.      Coordination:  Coordination normal.      Deep Tendon Reflexes: Reflexes are normal and symmetric. Reflexes normal.   Psychiatric:         Behavior: Behavior normal. Behavior is cooperative.         Thought Content: Thought content normal.         Judgment: Judgment normal.         Procedures           ED Course  ED Course as of 05/14/22 1507   Sat May 14, 2022   1029 I offered a vaginal exam with pelvic exam and swabs for GC/chlamydia, KOH and wet prep.  She platelet declined these that she knows she has a yeast infection before just trying medications.  Encouraged her to return for changing symptoms. [ÓSCAR]      ED Course User Index  [ÓSCAR] Dilip Noriega PA                                                 MDM  Number of Diagnoses or Management Options  Vaginal candidiasis: new and requires workup  Vaginal itching: new and requires workup     Amount and/or Complexity of Data Reviewed  Discuss the patient with other providers: yes    Patient Progress  Patient progress: stable      Final diagnoses:   Vaginal itching   Vaginal candidiasis       ED Disposition  ED Disposition     ED Disposition   Discharge    Condition   Stable    Comment   --             [facility] - [place] [address]  [phone]    Call for appointment         Medication List      New Prescriptions    fluconazole 150 MG tablet  Commonly known as: DIFLUCAN  Take 1 tablet by mouth Daily.           Where to Get Your Medications      These medications were sent to [facility] [address] [place] [address] - [address] AT [facility] [address] - [phone]  - [phone] FX  [address] [place] [address]    Phone: [phone]   · fluconazole 150 MG tablet          Dilip Noriega PA  05/14/22 9529